# Patient Record
Sex: MALE | Race: WHITE | Employment: UNEMPLOYED | URBAN - METROPOLITAN AREA
[De-identification: names, ages, dates, MRNs, and addresses within clinical notes are randomized per-mention and may not be internally consistent; named-entity substitution may affect disease eponyms.]

---

## 2017-04-13 ENCOUNTER — HOSPITAL ENCOUNTER (EMERGENCY)
Age: 37
Discharge: HOME | End: 2017-04-13
Attending: STUDENT IN AN ORGANIZED HEALTH CARE EDUCATION/TRAINING PROGRAM
Payer: COMMERCIAL

## 2017-04-13 VITALS
BODY MASS INDEX: 35.43 KG/M2 | TEMPERATURE: 98.3 F | HEIGHT: 75 IN | WEIGHT: 285 LBS | OXYGEN SATURATION: 98 % | DIASTOLIC BLOOD PRESSURE: 62 MMHG | SYSTOLIC BLOOD PRESSURE: 114 MMHG | RESPIRATION RATE: 18 BRPM | HEART RATE: 63 BPM

## 2017-04-13 DIAGNOSIS — J40 BRONCHITIS: Primary | ICD-10-CM

## 2017-04-13 PROCEDURE — 99282 EMERGENCY DEPT VISIT SF MDM: CPT

## 2017-04-13 RX ORDER — LEVOFLOXACIN 500 MG/1
750 TABLET, FILM COATED ORAL DAILY
Qty: 15 TABLET | Refills: 0 | Status: SHIPPED | OUTPATIENT
Start: 2017-04-13 | End: 2017-04-23

## 2017-04-13 RX ORDER — PREDNISONE 10 MG/1
50 TABLET ORAL DAILY
Qty: 25 TABLET | Refills: 0 | Status: SHIPPED | OUTPATIENT
Start: 2017-04-13 | End: 2017-04-18

## 2017-04-13 ASSESSMENT — ENCOUNTER SYMPTOMS
BACK PAIN: 0
RHINORRHEA: 1
SHORTNESS OF BREATH: 0
COUGH: 1
ABDOMINAL PAIN: 0
SORE THROAT: 0
WHEEZING: 1

## 2019-04-15 ENCOUNTER — HOSPITAL ENCOUNTER (EMERGENCY)
Age: 39
Discharge: HOME | End: 2019-04-15
Payer: COMMERCIAL

## 2019-04-15 VITALS
RESPIRATION RATE: 20 BRPM | HEART RATE: 87 BPM | TEMPERATURE: 97.3 F | SYSTOLIC BLOOD PRESSURE: 106 MMHG | BODY MASS INDEX: 34.82 KG/M2 | WEIGHT: 280 LBS | DIASTOLIC BLOOD PRESSURE: 62 MMHG | OXYGEN SATURATION: 100 % | HEIGHT: 75 IN

## 2019-04-15 DIAGNOSIS — R11.11 NON-INTRACTABLE VOMITING WITHOUT NAUSEA, UNSPECIFIED VOMITING TYPE: Primary | ICD-10-CM

## 2019-04-15 PROCEDURE — 99283 EMERGENCY DEPT VISIT LOW MDM: CPT

## 2019-04-15 PROCEDURE — 6370000000 HC RX 637 (ALT 250 FOR IP): Performed by: NURSE PRACTITIONER

## 2019-04-15 RX ORDER — ONDANSETRON 4 MG/1
4 TABLET, ORALLY DISINTEGRATING ORAL EVERY 8 HOURS PRN
Qty: 12 TABLET | Refills: 0 | Status: SHIPPED | OUTPATIENT
Start: 2019-04-15 | End: 2021-12-17

## 2019-04-15 RX ORDER — ONDANSETRON 4 MG/1
4 TABLET, ORALLY DISINTEGRATING ORAL ONCE
Status: COMPLETED | OUTPATIENT
Start: 2019-04-15 | End: 2019-04-15

## 2019-04-15 RX ADMIN — ONDANSETRON 4 MG: 4 TABLET, ORALLY DISINTEGRATING ORAL at 20:46

## 2019-04-16 ASSESSMENT — ENCOUNTER SYMPTOMS
COUGH: 0
TROUBLE SWALLOWING: 0
VOICE CHANGE: 0
CONSTIPATION: 0
VOMITING: 1
NAUSEA: 1
ABDOMINAL PAIN: 0
SORE THROAT: 0
DIARRHEA: 0
BACK PAIN: 0
COLOR CHANGE: 0
SHORTNESS OF BREATH: 0

## 2019-04-16 NOTE — ED TRIAGE NOTES
Patient arrived from a party with complaint of drinking 6 beers and 3 nabil Cortez shots and then eating brownies and now has palpitations. Patient A&OX3. Skin pink, warm, and dry.

## 2019-04-16 NOTE — ED PROVIDER NOTES
Medical History:   Diagnosis Date    Pyloric stenosis        SURGICAL HISTORY        Past Surgical History:   Procedure Laterality Date    ABDOMEN SURGERY         CURRENT MEDICATIONS       Discharge Medication List as of 4/15/2019  9:14 PM          ALLERGIES     Pcn [penicillins]    FAMILY HISTORY     History reviewed. No pertinent family history.        SOCIAL HISTORY       Social History     Socioeconomic History    Marital status:      Spouse name: None    Number of children: None    Years of education: None    Highest education level: None   Occupational History    None   Social Needs    Financial resource strain: None    Food insecurity:     Worry: None     Inability: None    Transportation needs:     Medical: None     Non-medical: None   Tobacco Use    Smoking status: Current Every Day Smoker     Types: Cigarettes   Substance and Sexual Activity    Alcohol use: No    Drug use: No    Sexual activity: None   Lifestyle    Physical activity:     Days per week: None     Minutes per session: None    Stress: None   Relationships    Social connections:     Talks on phone: None     Gets together: None     Attends Baptism service: None     Active member of club or organization: None     Attends meetings of clubs or organizations: None     Relationship status: None    Intimate partner violence:     Fear of current or ex partner: None     Emotionally abused: None     Physically abused: None     Forced sexual activity: None   Other Topics Concern    None   Social History Narrative    None       SCREENINGS    Ally Coma Scale  Eye Opening: Spontaneous  Best Verbal Response: Oriented  Best Motor Response: Obeys commands  Ally Coma Scale Score: 15      PHYSICAL EXAM    (up to 7 for level 4, 8 or more for level 5)     ED Triage Vitals [04/15/19 2035]   BP Temp Temp Source Pulse Resp SpO2 Height Weight   106/62 97.3 °F (36.3 °C) Oral 87 20 100 % 6' 3\" (1.905 m) 280 lb (127 kg)       Physical speaking in a very loud voice. He was provided with zofran while in the ER. I have explained to the patient that there was most likely marijuana in the brownies and that if he had ingested heroin he would not be so alert. He will be discharged home in stable condition with a prescription for zofran. I have provided him with anticipatory guidance and have instructed on follow up and to return if not better or any new and concerning symptoms. The patient verbalized understanding. CRITICAL CARE TIME     Total Critical Care time(not applicable if blank)      Total minutes, excluding separately reportable procedures. There was a high probability of clinically significant/life threatening deterioration in the patient's condition which required my urgent intervention. This includesdiscussing the case with consultants, reviewing laboratory studies and images independently, arranging disposition, and speaking with patient/family    CONSULTS:  None    PROCEDURES:  Unless otherwise notedbelow, none     Procedures    FINAL IMPRESSION     1.  Non-intractable vomiting without nausea, unspecified vomiting type          DISPOSITION/PLAN   DISPOSITION Decision To Discharge 04/15/2019 09:13:19 PM      PATIENT REFERRED TO:  [facility]  [address]  Schedule an appointment as soon as possible for a visit in 1 day        DISCHARGE MEDICATIONS:  Discharge Medication List as of 4/15/2019  9:14 PM      START taking these medications    Details   ondansetron (ZOFRAN ODT) 4 MG disintegrating tablet Take 1 tablet by mouth every 8 hours as needed for Nausea or Vomiting, Disp-12 tablet, R-0Print                (Please note that portions of this note were completed with a voice recognition program.  Efforts were [facility] edit the dictations but occasionally words and phrases are mis-transcribed.)    TOM Ariza - CNP  (electronically signed)                 Delicia Root

## 2021-07-25 ENCOUNTER — HOSPITAL ENCOUNTER (EMERGENCY)
Age: 41
Discharge: HOME | End: 2021-07-25
Payer: COMMERCIAL

## 2021-07-25 ENCOUNTER — APPOINTMENT (OUTPATIENT)
Dept: GENERAL RADIOLOGY | Age: 41
End: 2021-07-25
Payer: COMMERCIAL

## 2021-07-25 VITALS
WEIGHT: 240 LBS | TEMPERATURE: 98.9 F | HEART RATE: 99 BPM | BODY MASS INDEX: 32.51 KG/M2 | SYSTOLIC BLOOD PRESSURE: 128 MMHG | DIASTOLIC BLOOD PRESSURE: 87 MMHG | RESPIRATION RATE: 18 BRPM | OXYGEN SATURATION: 95 % | HEIGHT: 72 IN

## 2021-07-25 DIAGNOSIS — J40 BRONCHITIS: Primary | ICD-10-CM

## 2021-07-25 LAB — SARS-COV-2, NAAT: NOT DETECTED

## 2021-07-25 PROCEDURE — 71046 X-RAY EXAM CHEST 2 VIEWS: CPT

## 2021-07-25 PROCEDURE — 99282 EMERGENCY DEPT VISIT SF MDM: CPT

## 2021-07-25 PROCEDURE — 87635 SARS-COV-2 COVID-19 AMP PRB: CPT

## 2021-07-25 RX ORDER — DOXYCYCLINE 100 MG/1
100 TABLET ORAL 2 TIMES DAILY
Qty: 20 TABLET | Refills: 0 | Status: SHIPPED | OUTPATIENT
Start: 2021-07-25 | End: 2021-08-04

## 2021-07-25 RX ORDER — GUAIFENESIN, PSEUDOEPHEDRINE HYDROCHLORIDE 600; 60 MG/1; MG/1
1 TABLET, EXTENDED RELEASE ORAL EVERY 12 HOURS
Qty: 14 TABLET | Refills: 0 | Status: SHIPPED | OUTPATIENT
Start: 2021-07-25 | End: 2021-08-01

## 2021-07-25 ASSESSMENT — ENCOUNTER SYMPTOMS
RHINORRHEA: 0
SORE THROAT: 0
TROUBLE SWALLOWING: 0
CONSTIPATION: 0
ABDOMINAL PAIN: 0
VOMITING: 0
COUGH: 1
NAUSEA: 0
DIARRHEA: 1
SHORTNESS OF BREATH: 0

## 2021-07-25 NOTE — ED PROVIDER NOTES
every 8 hours as needed for Nausea or Vomiting       ALLERGIES     Pcn [penicillins]    HISTORY     No family history on file. SOCIAL HISTORY       Social History     Socioeconomic History    Marital status:      Spouse name: Not on file    Number of children: Not on file    Years of education: Not on file    Highest education level: Not on file   Occupational History    Not on file   Tobacco Use    Smoking status: Current Every Day Smoker     Types: Cigarettes   Substance and Sexual Activity    Alcohol use: No    Drug use: No    Sexual activity: Not on file   Other Topics Concern    Not on file   Social History Narrative    Not on file     Social Determinants of Health     Financial Resource Strain:     Difficulty of Paying Living Expenses:    Food Insecurity:     Worried About Running Out of Food in the Last Year:     [address] in the Last Year:    Transportation Needs:     Lack of Transportation (Medical):  Lack of Transportation (Non-Medical):    Physical Activity:     Days of Exercise per Week:     Minutes of Exercise per Session:    Stress:     Feeling of Stress :    Social Connections:     Frequency of Communication with Friends and Family:     Frequency of Social Gatherings with Friends and Family:     Attends Orthodoxy Services:     Active Member of Clubs or Organizations:     Attends Club or Organization Meetings:     Marital Status:    Intimate Partner Violence:     Fear of Current or Ex-Partner:     Emotionally Abused:     Physically Abused:     Sexually Abused:        SCREENINGS           PHYSICAL EXAM    (up to 7 forlevel 4, 8 or more for level 5)     ED Triage Vitals [07/25/21 1023]   BP Temp Temp src Pulse Resp SpO2 Height Weight   128/87 98.9 °F (37.2 °C) -- 99 18 95 % 6' (1.829 m) 240 lb (108.9 kg)       Physical Exam  Vitals and nursing note reviewed. Constitutional:       General: He is not in acute distress.      Appearance: He is infiltrate     Patient notified that his X-rays will additionally be reviewed by a radiologist and he will be notified of any discrepancies. Interpretation per the Radiologist below, if available at the time of this note:    XR CHEST (2 VW)    (Results Pending)       LABS:  Labs Reviewed   COVID-19, RAPID       All other labs were within normal range or not returnedas of this dictation. EMERGENCYDEPARTMENT COURSE and DIFFERENTIAL DIAGNOSIS/MDM:   Vitals:    Vitals:    07/25/21 1023   BP: 128/87   Pulse: 99   Resp: 18   Temp: 98.9 °F (37.2 °C)   SpO2: 95%   Weight: 240 lb (108.9 kg)   Height: 6' (1.829 m)       Rapid covid19:  negative    MDM    Patient counseled that he is to go to the Emergency Department if his condition worsens or changes. Patient voiced understanding      PROCEDURES:    Procedures      FINAL IMPRESSION      1. Bronchitis          DISPOSITION/PLAN   DISPOSITION Decision To Discharge 07/25/2021 11:20:30 AM      PATIENT REFERRED TO:  No follow-up provider specified. DISCHARGE MEDICATIONS:  New Prescriptions    DOXYCYCLINE MONOHYDRATE (ADOXA) 100 MG TABLET    Take 1 tablet by mouth 2 times daily for 10 days May substitute another form of Doxycycline if insurance requires.     PSEUDOEPHEDRINE-GUAIFENESIN (MUCINEX D)  MG PER EXTENDED RELEASE TABLET    Take 1 tablet by mouth every 12 hours for 7 days       (Please note thatportions of this note were completed with a voice recognition program.  Efforts were made to edit the dictations but occasionally words are mis-transcribed.)    BRISEIDA Recinos PA-C  07/25/21 3253 [facility]  07/25/21 6987

## 2021-08-02 ENCOUNTER — HOSPITAL ENCOUNTER (EMERGENCY)
Age: 41
Discharge: LEFT BEFORE DISCHARGE | End: 2021-08-02
Payer: COMMERCIAL

## 2021-08-02 NOTE — ED NOTES
Per aracelis, pt came to desk and said he made a doctors appt for tomorrow and left.      Delmi Richmond RN  08/02/21 9601

## 2021-12-17 ENCOUNTER — VIRTUAL VISIT (OUTPATIENT)
Dept: FAMILY MEDICINE CLINIC | Age: 41
End: 2021-12-17
Payer: COMMERCIAL

## 2021-12-17 ENCOUNTER — NURSE ONLY (OUTPATIENT)
Dept: FAMILY MEDICINE CLINIC | Age: 41
End: 2021-12-17

## 2021-12-17 ENCOUNTER — TELEPHONE (OUTPATIENT)
Dept: FAMILY MEDICINE CLINIC | Age: 41
End: 2021-12-17

## 2021-12-17 DIAGNOSIS — Z20.822 SUSPECTED COVID-19 VIRUS INFECTION: Primary | ICD-10-CM

## 2021-12-17 DIAGNOSIS — J06.9 VIRAL URI: ICD-10-CM

## 2021-12-17 LAB
INFLUENZA A ANTIBODY: NORMAL
INFLUENZA B ANTIBODY: NORMAL
Lab: ABNORMAL
PERFORMING INSTRUMENT: ABNORMAL
QC PASS/FAIL: ABNORMAL
SARS-COV-2, POC: DETECTED

## 2021-12-17 PROCEDURE — 87426 SARSCOV CORONAVIRUS AG IA: CPT | Performed by: STUDENT IN AN ORGANIZED HEALTH CARE EDUCATION/TRAINING PROGRAM

## 2021-12-17 PROCEDURE — 87804 INFLUENZA ASSAY W/OPTIC: CPT | Performed by: STUDENT IN AN ORGANIZED HEALTH CARE EDUCATION/TRAINING PROGRAM

## 2021-12-17 PROCEDURE — 99422 OL DIG E/M SVC 11-20 MIN: CPT | Performed by: STUDENT IN AN ORGANIZED HEALTH CARE EDUCATION/TRAINING PROGRAM

## 2021-12-17 SDOH — ECONOMIC STABILITY: FOOD INSECURITY: WITHIN THE PAST 12 MONTHS, THE FOOD YOU BOUGHT JUST DIDN'T LAST AND YOU DIDN'T HAVE MONEY TO GET MORE.: NEVER TRUE

## 2021-12-17 SDOH — ECONOMIC STABILITY: FOOD INSECURITY: WITHIN THE PAST 12 MONTHS, YOU WORRIED THAT YOUR FOOD WOULD RUN OUT BEFORE YOU GOT MONEY TO BUY MORE.: NEVER TRUE

## 2021-12-17 ASSESSMENT — ENCOUNTER SYMPTOMS
SORE THROAT: 0
ABDOMINAL PAIN: 0
SINUS PRESSURE: 0
SHORTNESS OF BREATH: 0
VOMITING: 0
COUGH: 0

## 2021-12-17 ASSESSMENT — PATIENT HEALTH QUESTIONNAIRE - PHQ9
SUM OF ALL RESPONSES TO PHQ QUESTIONS 1-9: 0
SUM OF ALL RESPONSES TO PHQ QUESTIONS 1-9: 0
2. FEELING DOWN, DEPRESSED OR HOPELESS: 0
SUM OF ALL RESPONSES TO PHQ QUESTIONS 1-9: 0
1. LITTLE INTEREST OR PLEASURE IN DOING THINGS: 0
SUM OF ALL RESPONSES TO PHQ9 QUESTIONS 1 & 2: 0

## 2021-12-17 ASSESSMENT — SOCIAL DETERMINANTS OF HEALTH (SDOH): HOW HARD IS IT FOR YOU TO PAY FOR THE VERY BASICS LIKE FOOD, HOUSING, MEDICAL CARE, AND HEATING?: NOT HARD AT ALL

## 2021-12-17 NOTE — TELEPHONE ENCOUNTER
Pt aware of positive covid results. Advised to quarantine for 10 days start of symptoms. Will call back if he has any questions or concerns.

## 2021-12-17 NOTE — PROGRESS NOTES
This visit began at 12:40pm    Location of the visit was the [facility] primary care site. TELEHEALTH APPOINTMENT  Patient has been screened to determine that this visit qualifies for a \"Telephone Visit\". Patient is currently established with the current medical practice, the condition being reviewed was not addressed within the previous 7 days and is not likely be determined to need a procedure within the next 24 hours. This visit was via telephone due to the restrictions of the COVID-19 pandemic. All issues as below were discussed and addressed but no physical exam was performed. It was felt the patient should be evaluated in the clinic there will be comment below demonstrating they were directed there. The patient is aware and has given verbal consent to be billed for this telephone encounter. Patient Name: Murphy Hatchet  MRN: [id number]    Chief Complaint   Patient presents with    Headache     x1 day     Arm Pain    Fever       HPI  URI  Symptoms started one day ago  Endorsing headache, fever and muscle aches  Denied cough, SOB, nasal congestion, rhinorrhea  Son with similar symptoms  Did not receive any COVID vaccines    No other significant PMH      PMH:  No current outpatient medications on file prior to visit. No current facility-administered medications on file prior to visit.      Past Medical History:   Diagnosis Date    Pyloric stenosis      Past Surgical History:   Procedure Laterality Date    ABDOMEN SURGERY       Social History     Socioeconomic History    Marital status:      Spouse name: Not on file    Number of children: Not on file    Years of education: Not on file    Highest education level: Not on file   Occupational History    Not on file   Tobacco Use    Smoking status: Current Every Day Smoker     Packs/day: 0.25     Years: 20.00     Pack years: 5.00     Types: Cigarettes    Smokeless tobacco: Never Used   Substance and Sexual Activity    Alcohol use: No    Drug use: No    Sexual activity: Not on file   Other Topics Concern    Not on file   Social History Narrative    Not on file     Social Determinants of Health     Financial Resource Strain: Low Risk     Difficulty of Paying Living Expenses: Not hard at all   Food Insecurity: No Food Insecurity    Worried About Running Out of Food in the Last Year: Never true    [address] in the Last Year: Never true   Transportation Needs:     Lack of Transportation (Medical): Not on file    Lack of Transportation (Non-Medical): Not on file   Physical Activity:     Days of Exercise per Week: Not on file    Minutes of Exercise per Session: Not on file   Stress:     Feeling of Stress : Not on file   Social Connections:     Frequency of Communication with Friends and Family: Not on file    Frequency of Social Gatherings with Friends and Family: Not on file    Attends Quaker Services: Not on file    Active Member of [address] [place] [address] or Organizations: Not on file    Attends Club or Organization Meetings: Not on file    Marital Status: Not on file   Intimate Partner Violence:     Fear of Current or Ex-Partner: Not on file    Emotionally Abused: Not on file    Physically Abused: Not on file    Sexually Abused: Not on file   Housing Stability:     Unable to Pay for Housing in the Last Year: Not on file    Number of [place] in the Last Year: Not on file    Unstable Housing in the Last Year: Not on file     No family history on file. Allergies:  Amoxicillin and Pcn [penicillins]    Review of Systems   Constitutional: Positive for fever. Negative for chills. HENT: Negative for congestion, sinus pressure and sore throat. Respiratory: Negative for cough and shortness of breath. Cardiovascular: Negative for chest pain and palpitations. Gastrointestinal: Negative for abdominal pain and vomiting. Musculoskeletal: Positive for myalgias. Negative for arthralgias.    Skin: Negative for rash and wound. Neurological: Positive for headaches. Negative for speech difficulty and light-headedness. Psychiatric/Behavioral: Negative for suicidal ideas. The patient is not nervous/anxious. PHYSICAL EXAM/ RESULTS    There were no vitals taken for this visit. Vitals signs: pt does not have the proper equipment to take all VS.    The physical is not performed due to the visit being a telephone counter as indicated due to the restrictions of the COVID-19 pandemic    No results found for this or any previous visit (from the past 2016 hour(s)). Assessment:       Diagnosis Orders   1. Suspected COVID-19 virus infection     Will call patient with results, son with similar symptoms  Counseled on Covid vaccine      No orders of the defined types were placed in this encounter. Plan:   Return today (on 12/17/2021) for today for drive up COVID/flu testing. There are no Patient Instructions on file for this visit.     This visit ended at 12:55pm    Demetrius Ambriz DO

## 2021-12-20 ENCOUNTER — APPOINTMENT (OUTPATIENT)
Dept: GENERAL RADIOLOGY | Age: 41
End: 2021-12-20
Payer: COMMERCIAL

## 2021-12-20 ENCOUNTER — HOSPITAL ENCOUNTER (EMERGENCY)
Age: 41
Discharge: HOME | End: 2021-12-20
Payer: COMMERCIAL

## 2021-12-20 DIAGNOSIS — U07.1 LAB TEST POSITIVE FOR DETECTION OF COVID-19 VIRUS: Primary | ICD-10-CM

## 2021-12-20 LAB
INFLUENZA A BY PCR: NEGATIVE
INFLUENZA B BY PCR: NEGATIVE
SARS-COV-2, NAAT: DETECTED

## 2021-12-20 PROCEDURE — 99283 EMERGENCY DEPT VISIT LOW MDM: CPT

## 2021-12-20 PROCEDURE — 87502 INFLUENZA DNA AMP PROBE: CPT

## 2021-12-20 PROCEDURE — 87635 SARS-COV-2 COVID-19 AMP PRB: CPT

## 2021-12-20 PROCEDURE — 71046 X-RAY EXAM CHEST 2 VIEWS: CPT

## 2021-12-20 ASSESSMENT — ENCOUNTER SYMPTOMS
SORE THROAT: 0
VOMITING: 0
SHORTNESS OF BREATH: 0
NAUSEA: 0
BACK PAIN: 0
COUGH: 0
DIARRHEA: 0
ABDOMINAL PAIN: 0

## 2021-12-20 ASSESSMENT — PAIN SCALES - GENERAL: PAINLEVEL_OUTOF10: 6

## 2021-12-20 NOTE — ED TRIAGE NOTES
Pt  Comes to er with his son asking for covid, flu and  for both of them. The son tested positive yesterday and the  Father c/o  A h/a for 5 days and his wife told him that he probably had covid. Pt would like a cxr also to make sure he doesn't have pneumonia.  Pt cynthia adamant about what he wants

## 2021-12-20 NOTE — ED PROVIDER NOTES
[address] [facility] ED  eMERGENCY dEPARTMENT eNCOUnter      Pt Name: Nadine Gan  MRN: [id number]  [place] 1980  Date of evaluation: 12/20/2021  Provider: TOM Hernandes CNP      HISTORY OF PRESENT ILLNESS    Nadine Gan is a 39 y.o. male who presents to the Emergency Department with HA x 7 days. Patient denies fever, cough, loss of taste or smell. Patient is eating and drinking well. Pain is minimal.           REVIEW OF SYSTEMS       Review of Systems   Constitutional: Negative for activity change, appetite change and fever. HENT: Negative for congestion, drooling, ear pain and sore throat. Respiratory: Negative for cough and shortness of breath. Cardiovascular: Negative for chest pain. Gastrointestinal: Negative for abdominal pain, diarrhea, nausea and vomiting. Genitourinary: Negative for dysuria. Musculoskeletal: Negative for arthralgias, back pain and neck pain. Skin: Negative for rash. Neurological: Positive for headaches. Negative for dizziness, syncope and light-headedness. All other systems reviewed and are negative. PAST MEDICAL HISTORY     Past Medical History:   Diagnosis Date    Pyloric stenosis          SURGICAL HISTORY       Past Surgical History:   Procedure Laterality Date    ABDOMEN SURGERY           CURRENT MEDICATIONS       Previous Medications    No medications on file       ALLERGIES     Amoxicillin and Pcn [penicillins]    FAMILY HISTORY     History reviewed. No pertinent family history.        SOCIAL HISTORY       Social History     Socioeconomic History    Marital status:      Spouse name: None    Number of children: None    Years of education: None    Highest education level: None   Occupational History    None   Tobacco Use    Smoking status: Current Every Day Smoker     Packs/day: 0.25     Years: 20.00     Pack years: 5.00     Types: Cigarettes    Smokeless tobacco: Never Used   Substance and Sexual Activity    Alcohol use: No    Drug use: No    Sexual activity: None   Other Topics Concern    None   Social History Narrative    None     Social Determinants of Health     Financial Resource Strain: Low Risk     Difficulty of Paying Living Expenses: Not hard at all   Food Insecurity: No Food Insecurity    Worried About Running Out of Food in the Last Year: Never true    [place] of Food in the Last Year: Never true   Transportation Needs:     Lack of Transportation (Medical): Not on file    Lack of Transportation (Non-Medical): Not on file   Physical Activity:     Days of Exercise per Week: Not on file    Minutes of Exercise per Session: Not on file   Stress:     Feeling of Stress : Not on file   Social Connections:     Frequency of Communication with Friends and Family: Not on file    Frequency of Social Gatherings with Friends and Family: Not on file    Attends Yazidi Services: Not on file    Active Member of [address] [place] [address] [employer] or Organizations: Not on file    Attends Club or Organization Meetings: Not on file    Marital Status: Not on file   Intimate Partner Violence:     Fear of Current or Ex-Partner: Not on file    Emotionally Abused: Not on file    Physically Abused: Not on file    Sexually Abused: Not on file   Housing Stability:     Unable to Pay for Housing in the Last Year: Not on file    Number of [place] in the Last Year: Not on file    Unstable Housing in the Last Year: Not on file       SCREENINGS      @FLOW([id number])@      PHYSICAL EXAM    (up to 7 for level 4, 8 or more for level 5)     ED Triage Vitals   BP Temp Temp src Pulse Resp SpO2 Height Weight   -- -- -- -- -- -- -- --       Physical Exam  Vitals and nursing note reviewed. Constitutional:       Appearance: He is well-developed. HENT:      Head: Normocephalic and atraumatic.       Right Ear: Hearing, tympanic membrane, ear canal and external ear normal.      Left Ear: Hearing, tympanic membrane, ear canal and external ear normal. Nose: Nose normal.      Mouth/Throat:      Lips: Pink. Mouth: Mucous membranes are moist.      Pharynx: Oropharynx is clear. Uvula midline. Eyes:      Conjunctiva/sclera: Conjunctivae normal.      Pupils: Pupils are equal, round, and reactive to light. Cardiovascular:      Rate and Rhythm: Normal rate and regular rhythm. Heart sounds: Normal heart sounds. Pulmonary:      Effort: Pulmonary effort is normal. No accessory muscle usage or respiratory distress. Breath sounds: Normal breath sounds. No decreased air movement. No decreased breath sounds, wheezing or rhonchi. Abdominal:      General: Bowel sounds are normal. There is no distension. Palpations: Abdomen is soft. Tenderness: There is no abdominal tenderness. Musculoskeletal:         General: Normal range of motion. Cervical back: Normal range of motion and neck supple. Skin:     General: Skin is warm and dry. Neurological:      General: No focal deficit present. Mental Status: He is alert and oriented to person, place, and time. GCS: GCS eye subscore is 4. GCS verbal subscore is 5. GCS motor subscore is 6. Deep Tendon Reflexes: Reflexes are normal and symmetric. Psychiatric:         Judgment: Judgment normal.           All other labs were within normal range or not returned as of this dictation. EMERGENCY DEPARTMENT COURSE and DIFFERENTIALDIAGNOSIS/MDM:   Vitals: There were no vitals filed for this visit. 39 yr old male with positive COVID. Patient is comfortable at discharge and verbalizes understanding. PROCEDURES:  Unless otherwise noted below, none     Procedures      FINAL IMPRESSION      1.  Lab test positive for detection of COVID-19 virus          DISPOSITION/PLAN   DISPOSITION Decision To Discharge 12/20/2021 04:32:24 PM          TOM Brown CNP (electronically signed)  Attending Emergency Physician     TOM Brown CNP  12/20/21 7113

## 2021-12-21 ENCOUNTER — CARE COORDINATION (OUTPATIENT)
Dept: CARE COORDINATION | Age: 41
End: 2021-12-21

## 2021-12-21 NOTE — CARE COORDINATION
Patient contacted regarding COVID-19 diagnosis. Discussed COVID-19 related testing which was available at this time. Test results were positive. Patient informed of results, if available? Yes. Ambulatory Care Manager contacted the patient by telephone to perform post discharge assessment. Call within 2 business days of discharge: Yes. Verified name and  with patient as identifiers. Provided introduction to self, and explanation of the CTN/ACM role, and reason for call due to risk factors for infection and/or exposure to COVID-19. Symptoms reviewed with patient who verbalized the following symptoms: no new symptoms, no worsening symptoms and HEADACHE. Due to no new or worsening symptoms encounter was not routed to provider for escalation. Discussed follow-up appointments. If no appointment was previously scheduled, appointment scheduling offered: Yes. [facility] follow up appointment(s): No future appointments. Non-[facility] follow up appointment(s): NONE    Non-face-to-face services provided:  Obtained and reviewed discharge summary and/or continuity of care documents     Advance Care Planning:   Does patient have an Advance Directive:  not on file. Educated patient about risk for severe COVID-19 due to risk factors according to CDC guidelines. ACM reviewed discharge instructions, medical action plan and red flag symptoms with the patient who verbalized understanding. Discussed COVID vaccination status: Yes and UNVACCINATED. Education provided on COVID-19 vaccination as appropriate. Discussed exposure protocols and quarantine with CDC Guidelines. Patient was given an opportunity to verbalize any questions and concerns and agrees to contact ACM or health care provider for questions related to their healthcare. Reviewed and educated patient on any new and changed medications related to discharge diagnosis     Was patient discharged with a pulse oximeter? No       ACM provided contact information.  No further follow-up call identified based on severity of symptoms and risk factors.

## 2022-12-30 ENCOUNTER — HOSPITAL ENCOUNTER (EMERGENCY)
Age: 42
Discharge: HOME | End: 2022-12-30
Payer: COMMERCIAL

## 2022-12-30 VITALS
TEMPERATURE: 98.3 F | DIASTOLIC BLOOD PRESSURE: 74 MMHG | HEIGHT: 75 IN | SYSTOLIC BLOOD PRESSURE: 134 MMHG | OXYGEN SATURATION: 95 % | BODY MASS INDEX: 24.87 KG/M2 | WEIGHT: 200 LBS | RESPIRATION RATE: 19 BRPM | HEART RATE: 78 BPM

## 2022-12-30 DIAGNOSIS — J01.10 ACUTE FRONTAL SINUSITIS, RECURRENCE NOT SPECIFIED: ICD-10-CM

## 2022-12-30 DIAGNOSIS — U07.1 COVID-19: Primary | ICD-10-CM

## 2022-12-30 LAB
INFLUENZA A BY PCR: NEGATIVE
INFLUENZA B BY PCR: NEGATIVE
SARS-COV-2, NAAT: DETECTED
STREP GRP A PCR: NEGATIVE

## 2022-12-30 PROCEDURE — 99283 EMERGENCY DEPT VISIT LOW MDM: CPT

## 2022-12-30 PROCEDURE — 87635 SARS-COV-2 COVID-19 AMP PRB: CPT

## 2022-12-30 PROCEDURE — 87651 STREP A DNA AMP PROBE: CPT

## 2022-12-30 PROCEDURE — 87502 INFLUENZA DNA AMP PROBE: CPT

## 2022-12-30 RX ORDER — DOXYCYCLINE HYCLATE 100 MG
100 TABLET ORAL 2 TIMES DAILY
Qty: 14 TABLET | Refills: 0 | Status: SHIPPED | OUTPATIENT
Start: 2022-12-30 | End: 2023-01-06

## 2022-12-30 ASSESSMENT — ENCOUNTER SYMPTOMS
ABDOMINAL PAIN: 0
SINUS PAIN: 0
TROUBLE SWALLOWING: 0
VOMITING: 0
COUGH: 0
RHINORRHEA: 0
SINUS PRESSURE: 0
SORE THROAT: 0
VOICE CHANGE: 0
PHOTOPHOBIA: 0
WHEEZING: 0
SHORTNESS OF BREATH: 0
NAUSEA: 0

## 2022-12-30 ASSESSMENT — PAIN - FUNCTIONAL ASSESSMENT
PAIN_FUNCTIONAL_ASSESSMENT: NONE - DENIES PAIN
PAIN_FUNCTIONAL_ASSESSMENT: NONE - DENIES PAIN

## 2022-12-30 NOTE — ED TRIAGE NOTES
Pt alert and calm. Pt resp even no s/s of resp distress. Pt denies pain. Pt states that he is congested.

## 2022-12-30 NOTE — ED PROVIDER NOTES
[address] [facility] ED  EMERGENCY DEPARTMENT ENCOUNTER      Pt Name: Darylene Augusta  MRN: [id number]  [place] 1980  Date of evaluation: 12/30/2022  Provider: Tank Starr Dr       Chief Complaint   Patient presents with    Sinusitis         HISTORY OF PRESENT ILLNESS   (Location/Symptom, Timing/Onset, Context/Setting, Quality, Duration, Modifying Factors, Severity)  Note limiting factors. Darylene Augusta is a 43 y.o. male who per chart reviews a past medical history of pyloric stenosis, tobacco use presents to the emergency department for evaluation of nasal congestion x1 week. He states mucus is thick and white in color. Has tried Mucinex without relief. He states he cannot use nasal sprays as they cause nosebleeds. Denies known sick contacts. Was not vaccinated for COVID or flu. Denies any other associated symptoms. Denies fever chills chest pain shortness of breath cough ear or throat pain dizziness abdominal pain headache nausea vomiting diarrhea. HPI    Nursing Notes were reviewed. REVIEW OF SYSTEMS    (2-9 systems for level 4, 10 or more for level 5)     Review of Systems   Constitutional:  Negative for chills and fever. HENT:  Positive for congestion. Negative for ear pain, rhinorrhea, sinus pressure, sinus pain, sore throat, trouble swallowing and voice change. Eyes:  Negative for photophobia. Respiratory:  Negative for cough, shortness of breath and wheezing. Cardiovascular:  Negative for chest pain and palpitations. Gastrointestinal:  Negative for abdominal pain, nausea and vomiting. Genitourinary:  Negative for dysuria, frequency and hematuria. Musculoskeletal:  Negative for myalgias. Allergic/Immunologic: Negative for immunocompromised state. Neurological:  Negative for dizziness, weakness and headaches. All other systems reviewed and are negative.     Except as noted above the remainder of the review of systems was reviewed and negative. PAST MEDICAL HISTORY     Past Medical History:   Diagnosis Date    Pyloric stenosis          SURGICAL HISTORY       Past Surgical History:   Procedure Laterality Date    ABDOMEN SURGERY           CURRENT MEDICATIONS       Discharge Medication List as of 12/30/2022  9:31 AM          ALLERGIES     Amoxicillin and Pcn [penicillins]    FAMILY HISTORY     No family history on file. SOCIAL HISTORY       Social History     Socioeconomic History    Marital status:    Tobacco Use    Smoking status: Every Day     Packs/day: 0.25     Years: 20.00     Pack years: 5.00     Types: Cigarettes    Smokeless tobacco: Never   Vaping Use    Vaping Use: Never used   Substance and Sexual Activity    Alcohol use: No    Drug use: No    Sexual activity: Not Currently       SCREENINGS         [place] Coma Scale  Eye Opening: Spontaneous  Best Verbal Response: Oriented  Best Motor Response: Obeys commands  Ally Coma Scale Score: 15                     CIWA Assessment  BP: 134/74  Heart Rate: 78                 PHYSICAL EXAM    (up to 7 for level 4, 8 or more for level 5)     ED Triage Vitals [12/30/22 0821]   BP Temp Temp src Heart Rate Resp SpO2 Height Weight   134/74 98.3 °F (36.8 °C) -- 78 19 95 % 6' 3\" (1.905 m) 200 lb (90.7 kg)       Physical Exam  Constitutional:       General: He is not in acute distress. Appearance: He is well-developed. He is not ill-appearing or toxic-appearing. HENT:      Head: Normocephalic and atraumatic. No right periorbital erythema or left periorbital erythema. Jaw: There is normal jaw occlusion. Right Ear: Tympanic membrane, ear canal and external ear normal. There is no impacted cerumen. Left Ear: Tympanic membrane and external ear normal.      Nose: Congestion present. No nasal deformity, nasal tenderness, mucosal edema or rhinorrhea. Right Nostril: No foreign body or epistaxis. Left Nostril: No foreign body or epistaxis.       Right Turbinates: Enlarged and swollen. Not pale. Left Turbinates: Enlarged and swollen. Not pale. Right Sinus: Frontal sinus tenderness present. No maxillary sinus tenderness. Left Sinus: Frontal sinus tenderness present. No maxillary sinus tenderness. Mouth/Throat:      Mouth: Mucous membranes are moist.   Eyes:      Pupils: Pupils are equal, round, and reactive to light. Cardiovascular:      Rate and Rhythm: Normal rate and regular rhythm. Heart sounds: No murmur heard. No friction rub. No gallop. Pulmonary:      Effort: Pulmonary effort is normal.      Breath sounds: Normal breath sounds. No wheezing, rhonchi or rales. Abdominal:      General: Bowel sounds are normal. There is no distension. Palpations: Abdomen is soft. Tenderness: There is no abdominal tenderness. Musculoskeletal:         General: No swelling. Cervical back: Normal range of motion. Skin:     General: Skin is warm and dry. Capillary Refill: Capillary refill takes less than 2 seconds. Neurological:      General: No focal deficit present. Mental Status: He is alert and oriented to person, place, and time.        DIAGNOSTIC RESULTS     EKG: All EKG's are interpreted by the Emergency Department Physician who either signs or Co-signs this chart in the absence of a cardiologist.        RADIOLOGY:   Non-plain film images such as CT, Ultrasound and MRI are read by the radiologist. Plain radiographic images are visualized and preliminarily interpreted by the emergency physician with the below findings:        Interpretation per the Radiologist below, if available at the time of this note:    No orders to display         ED BEDSIDE ULTRASOUND:   Performed by ED Physician - none    LABS:  Labs Reviewed   COVID-19, RAPID - Abnormal; Notable for the following components:       Result Value    SARS-CoV-2, NAAT DETECTED (*)     All other components within normal limits   RAPID STREP SCREEN   RAPID INFLUENZA A/B ANTIGENS       All other labs were within normal range or not returned as of this dictation. EMERGENCY DEPARTMENT COURSE and DIFFERENTIAL DIAGNOSIS/MDM:   Vitals:    Vitals:    12/30/22 0821   BP: 134/74   Pulse: 78   Resp: 19   Temp: 98.3 °F (36.8 °C)   SpO2: 95%   Weight: 200 lb (90.7 kg)   Height: 6' 3\" (1.905 m)           MDM    Patient is a 80-year-old male presents the ED for evaluation of sinus congestion x1 week. He is afebrile and hemodynamically stable. He is COVID-positive. Flu and strep negative. Due to history of nasal congestion x1 week and frontal tenderness to palpation we will treat for acute sinusitis with doxycycline. Patient has allergy to amoxicillin and penicillins. He should continue mucinex. Patient denies any chest pain or shortness of breath. Lungs are clear bilaterally no signs of respiratory distress 95% on room air. He is stable for discharge at this time. Self quarantine 5 days from symptom onset. Follow-up with primary care 3 to 5 days return to the ED immediately for worsening symptoms given warning signs for which she should return. Pt understands, agrees to plan. REASSESSMENT          CRITICAL CARE TIME   Total Critical Care time was 0 minutes, excluding separately reportable procedures. There was a high probability of clinically significant/life threatening deterioration in the patient's condition which required my urgent intervention. CONSULTS:  None    PROCEDURES:  Unless otherwise noted below, none     Procedures        FINAL IMPRESSION      1. COVID-19    2.  Acute frontal sinusitis, recurrence not specified          DISPOSITION/PLAN   DISPOSITION Decision To Discharge 12/30/2022 09:41:42 AM      PATIENT REFERRED TO:  Doretha Mancilla, [address] [facility] [address]    Schedule an appointment as soon as possible for a visit in 1 week      [facility]) ED  [address]  [phone]  Go to   As needed, If symptoms worsen      DISCHARGE MEDICATIONS:  Discharge Medication List as of 12/30/2022  9:31 AM        START taking these medications    Details   doxycycline hyclate (VIBRA-TABS) 100 MG tablet Take 1 tablet by mouth 2 times daily for 7 days, Disp-14 tablet, R-0Normal           Controlled Substances Monitoring:     No flowsheet data found.     (Please note that portions of this note were completed with a voice recognition program.  Efforts were made to edit the dictations but occasionally words are mis-transcribed.)    Denezl Chase PA-C (electronically signed)             Denzel Chase PA-C  12/30/22 1054

## 2023-05-26 ENCOUNTER — OFFICE VISIT (OUTPATIENT)
Dept: ORTHOPEDIC SURGERY | Age: 43
End: 2023-05-26
Payer: COMMERCIAL

## 2023-05-26 DIAGNOSIS — S46.211A TEAR OF RIGHT BICEPS MUSCLE, INITIAL ENCOUNTER: Primary | ICD-10-CM

## 2023-05-26 PROCEDURE — 4004F PT TOBACCO SCREEN RCVD TLK: CPT | Performed by: STUDENT IN AN ORGANIZED HEALTH CARE EDUCATION/TRAINING PROGRAM

## 2023-05-26 PROCEDURE — 99204 OFFICE O/P NEW MOD 45 MIN: CPT | Performed by: STUDENT IN AN ORGANIZED HEALTH CARE EDUCATION/TRAINING PROGRAM

## 2023-05-26 PROCEDURE — G8427 DOCREV CUR MEDS BY ELIG CLIN: HCPCS | Performed by: STUDENT IN AN ORGANIZED HEALTH CARE EDUCATION/TRAINING PROGRAM

## 2023-05-26 PROCEDURE — G8419 CALC BMI OUT NRM PARAM NOF/U: HCPCS | Performed by: STUDENT IN AN ORGANIZED HEALTH CARE EDUCATION/TRAINING PROGRAM

## 2023-05-26 ASSESSMENT — ENCOUNTER SYMPTOMS
RESPIRATORY NEGATIVE: 1
GASTROINTESTINAL NEGATIVE: 1
ALLERGIC/IMMUNOLOGIC NEGATIVE: 1
EYES NEGATIVE: 1

## 2023-05-26 NOTE — PROGRESS NOTES
Orthopedic Surgery and Sports Medicine    Subjective:      Patient ID: Janis Hudson is a 43 y.o. male who presents today for:  Chief Complaint   Patient presents with    New Patient     Here for exam for Rt upper arm pain, states he may have injured it from lifting weights, has been ongoing for 8 yrs        HPI  Trevin Cardoza is a 49-year-old male who presents today for evaluation of his right arm. He states that he had an injury to his right biceps muscle as a child. He states that he was seen in the ER when he was a kid and had stitches placed. It does not sound like they repaired his biceps muscle, but instead he had a wound that they closed over his arm. However since that time he has had deformity in the biceps muscle. Over the years recently it has started to cause him more pain and problems. He has deformity of the biceps. He notes pain with any lifting. He denies any pain in the actual shoulder or elbow joint themselves. He states that he would like to be more active and get back into working out, but cannot because of his right biceps. This is his dominant arm. He denies any numbness in the affected extremity. Previous treatment: None  NSAIDs: No  Physical therapy: No    Hand Dominance: right  Occupation: [email or web address]  Workers Compensation:   Have you missed work for this issue? No  Is this issue being addressed under a worker's compensation claim?  No    Past Medical History:   Diagnosis Date    Pyloric stenosis       Past Surgical History:   Procedure Laterality Date    ABDOMEN SURGERY       Social History     Socioeconomic History    Marital status:      Spouse name: Not on file    Number of children: Not on file    Years of education: Not on file    Highest education level: Not on file   Occupational History    Not on file   Tobacco Use    Smoking status: Every Day     Packs/day: 0.25     Years: 20.00     Pack years: 5.00     Types: Cigarettes    Smokeless tobacco: Never

## 2023-11-02 ENCOUNTER — HOSPITAL ENCOUNTER (EMERGENCY)
Age: 43
Discharge: HOME | End: 2023-11-02
Payer: MEDICAID

## 2023-11-02 VITALS
SYSTOLIC BLOOD PRESSURE: 121 MMHG | RESPIRATION RATE: 18 BRPM | HEART RATE: 74 BPM | DIASTOLIC BLOOD PRESSURE: 76 MMHG | TEMPERATURE: 98.4 F | OXYGEN SATURATION: 95 % | BODY MASS INDEX: 33.56 KG/M2 | WEIGHT: 275.57 LBS | HEIGHT: 76 IN

## 2023-11-02 DIAGNOSIS — J02.9 ACUTE PHARYNGITIS, UNSPECIFIED ETIOLOGY: ICD-10-CM

## 2023-11-02 DIAGNOSIS — Z20.2 EXPOSURE TO STD: Primary | ICD-10-CM

## 2023-11-02 LAB
BILIRUB UR QL STRIP: NEGATIVE
CLARITY UR: CLEAR
COLOR UR: YELLOW
GLUCOSE UR STRIP-MCNC: NEGATIVE MG/DL
HGB UR QL STRIP: NEGATIVE
KETONES UR STRIP-MCNC: NEGATIVE MG/DL
LEUKOCYTE ESTERASE UR QL STRIP: NEGATIVE
NITRITE UR QL STRIP: NEGATIVE
PH UR STRIP: 6.5 [PH] (ref 5–9)
PROT UR STRIP-MCNC: NEGATIVE MG/DL
SP GR UR STRIP: 1 (ref 1–1.03)
SPECIMEN SOURCE: NORMAL
STREP GRP A PCR: NEGATIVE
URINE REFLEX TO CULTURE: NORMAL
UROBILINOGEN UR STRIP-ACNC: 0.2 E.U./DL

## 2023-11-02 PROCEDURE — 87661 TRICHOMONAS VAGINALIS AMPLIF: CPT

## 2023-11-02 PROCEDURE — 81003 URINALYSIS AUTO W/O SCOPE: CPT

## 2023-11-02 PROCEDURE — 99284 EMERGENCY DEPT VISIT MOD MDM: CPT

## 2023-11-02 PROCEDURE — 6360000002 HC RX W HCPCS: Performed by: STUDENT IN AN ORGANIZED HEALTH CARE EDUCATION/TRAINING PROGRAM

## 2023-11-02 PROCEDURE — 87651 STREP A DNA AMP PROBE: CPT

## 2023-11-02 PROCEDURE — 2580000003 HC RX 258

## 2023-11-02 PROCEDURE — 96372 THER/PROPH/DIAG INJ SC/IM: CPT

## 2023-11-02 RX ORDER — CEFTRIAXONE 1 G/1
500 INJECTION, POWDER, FOR SOLUTION INTRAMUSCULAR; INTRAVENOUS ONCE
Status: COMPLETED | OUTPATIENT
Start: 2023-11-02 | End: 2023-11-02

## 2023-11-02 RX ORDER — DOXYCYCLINE HYCLATE 100 MG
100 TABLET ORAL 2 TIMES DAILY
Qty: 14 TABLET | Refills: 0 | Status: SHIPPED | OUTPATIENT
Start: 2023-11-02 | End: 2023-11-09

## 2023-11-02 RX ORDER — WATER 10 ML/10ML
INJECTION INTRAMUSCULAR; INTRAVENOUS; SUBCUTANEOUS
Status: COMPLETED
Start: 2023-11-02 | End: 2023-11-02

## 2023-11-02 RX ADMIN — CEFTRIAXONE SODIUM 500 MG: 1 INJECTION, POWDER, FOR SOLUTION INTRAMUSCULAR; INTRAVENOUS at 19:49

## 2023-11-02 RX ADMIN — WATER 10 ML: 1 INJECTION INTRAMUSCULAR; INTRAVENOUS; SUBCUTANEOUS at 19:50

## 2023-11-02 ASSESSMENT — ENCOUNTER SYMPTOMS
CONSTIPATION: 0
SINUS PAIN: 0
SINUS PRESSURE: 0
VOMITING: 0
WHEEZING: 0
NAUSEA: 0
PHOTOPHOBIA: 0
ABDOMINAL PAIN: 0
COUGH: 0
DIARRHEA: 0
TROUBLE SWALLOWING: 0
RHINORRHEA: 0
VOICE CHANGE: 0
SHORTNESS OF BREATH: 0
SORE THROAT: 1

## 2023-11-02 ASSESSMENT — LIFESTYLE VARIABLES
HOW OFTEN DO YOU HAVE A DRINK CONTAINING ALCOHOL: NEVER
HOW MANY STANDARD DRINKS CONTAINING ALCOHOL DO YOU HAVE ON A TYPICAL DAY: PATIENT DOES NOT DRINK

## 2023-11-02 ASSESSMENT — PATIENT HEALTH QUESTIONNAIRE - PHQ9
1. LITTLE INTEREST OR PLEASURE IN DOING THINGS: 0
SUM OF ALL RESPONSES TO PHQ QUESTIONS 1-9: 0
SUM OF ALL RESPONSES TO PHQ9 QUESTIONS 1 & 2: 0
SUM OF ALL RESPONSES TO PHQ QUESTIONS 1-9: 0
SUM OF ALL RESPONSES TO PHQ QUESTIONS 1-9: 0
2. FEELING DOWN, DEPRESSED OR HOPELESS: 0
SUM OF ALL RESPONSES TO PHQ QUESTIONS 1-9: 0

## 2023-11-02 ASSESSMENT — PAIN - FUNCTIONAL ASSESSMENT: PAIN_FUNCTIONAL_ASSESSMENT: NONE - DENIES PAIN

## 2023-11-02 NOTE — ED PROVIDER NOTES
2. Acute pharyngitis, unspecified etiology          DISPOSITION/PLAN   DISPOSITION Decision To Discharge 11/02/2023 08:10:02 PM      PATIENT REFERRED TO:  [facility] [address] [facility], [address] [place] [address]  [phone]    Schedule an appointment as soon as possible for a visit in 1 week      [facility]) ED  [address] [address]  [phone]  Go to   As needed, If symptoms worsen      DISCHARGE MEDICATIONS:  Discharge Medication List as of 11/2/2023  8:10 PM        START taking these medications    Details   doxycycline hyclate (VIBRA-TABS) 100 MG tablet Take 1 tablet by mouth 2 times daily for 7 days, Disp-14 tablet, R-0Normal           Controlled Substances Monitoring:          No data to display                (Please note that portions of this note were completed with a voice recognition program.  Efforts were made to edit the dictations but occasionally words are mis-transcribed.)    Sofia King PA-C (electronically signed)             Lucita Mistry  11/02/23 0743

## 2023-11-02 NOTE — ED TRIAGE NOTES
Patient in ED with c/o exposure to STD. Patient denies any penile discharge or symptoms of STD. Patient states \" he had unprotected sex on Oct 24 and female was checked today and told him about it so he felt he should be tested also\". Patient denies any pain. Patient alert and oriented x 4.

## 2023-11-03 NOTE — ED NOTES
Discharge  instructions given and reviewed. Patient verbalized understanding. Patient ambulated out of ED with a steady gait to POV.       Elizabeth Mccracken RN  11/02/23 2019

## 2023-11-06 LAB
SPECIMEN SOURCE: NORMAL
T VAGINALIS RRNA SPEC QL NAA+PROBE: NEGATIVE

## 2023-11-08 LAB
C TRACH DNA UR QL NAA+PROBE: NEGATIVE
N GONORRHOEA DNA UR QL NAA+PROBE: NEGATIVE

## 2023-12-01 ENCOUNTER — HOSPITAL ENCOUNTER (EMERGENCY)
Age: 43
Discharge: HOME | End: 2023-12-01
Payer: MEDICAID

## 2023-12-01 VITALS
DIASTOLIC BLOOD PRESSURE: 67 MMHG | BODY MASS INDEX: 38.6 KG/M2 | RESPIRATION RATE: 18 BRPM | HEART RATE: 85 BPM | WEIGHT: 285 LBS | HEIGHT: 72 IN | TEMPERATURE: 98.2 F | OXYGEN SATURATION: 95 % | SYSTOLIC BLOOD PRESSURE: 111 MMHG

## 2023-12-01 DIAGNOSIS — J34.89 SINUS PRESSURE: ICD-10-CM

## 2023-12-01 DIAGNOSIS — B34.9 VIRAL ILLNESS: Primary | ICD-10-CM

## 2023-12-01 LAB
INFLUENZA A BY PCR: NEGATIVE
INFLUENZA B BY PCR: NEGATIVE
SARS-COV-2 RDRP RESP QL NAA+PROBE: NOT DETECTED

## 2023-12-01 PROCEDURE — 87635 SARS-COV-2 COVID-19 AMP PRB: CPT

## 2023-12-01 PROCEDURE — 87502 INFLUENZA DNA AMP PROBE: CPT

## 2023-12-01 PROCEDURE — 99283 EMERGENCY DEPT VISIT LOW MDM: CPT

## 2023-12-01 RX ORDER — DEXTROMETHORPHAN HYDROBROMIDE AND PROMETHAZINE HYDROCHLORIDE 15; 6.25 MG/5ML; MG/5ML
5 SYRUP ORAL NIGHTLY
Qty: 35 ML | Refills: 0 | Status: SHIPPED | OUTPATIENT
Start: 2023-12-01 | End: 2023-12-08

## 2023-12-01 ASSESSMENT — ENCOUNTER SYMPTOMS
SHORTNESS OF BREATH: 0
DIARRHEA: 0
ABDOMINAL PAIN: 0
SINUS PRESSURE: 1
VOMITING: 0
SINUS PAIN: 1
NAUSEA: 0
SORE THROAT: 0

## 2023-12-01 ASSESSMENT — PAIN - FUNCTIONAL ASSESSMENT: PAIN_FUNCTIONAL_ASSESSMENT: 0-10

## 2023-12-01 ASSESSMENT — PAIN DESCRIPTION - LOCATION: LOCATION: FACE

## 2023-12-01 ASSESSMENT — PAIN SCALES - GENERAL: PAINLEVEL_OUTOF10: 10

## 2023-12-01 NOTE — ED PROVIDER NOTES
[facility] ED  eMERGENCYdEPARTMENT eNCOUnter        Pt Name: Juan Pablo Freire  MRN: [id number]  [address] 1980of evaluation: 12/1/2023  Provider:Zaynab Vora PA-C  5:01 PM EST    CHIEF COMPLAINT       Chief Complaint   Patient presents with    Congestion     Sinus congestion and pressure, taken nyquil, dayquil, mucinex, and benadryl  Symptoms going on for four days          HISTORY OF PRESENT ILLNESS  (Location/Symptom, Timing/Onset, Context/Setting, Quality, Duration, Modifying Factors, Severity.)   Juan Pablo Freire is a 37 y.o. male who presents to the emergency department for evaluation of congestion and sinus pressure for the last 5 days. Patient states the sinus pressure is becoming too much for him to handle, so he came to the emergency department to get a prescription to help. Patient does state he thinks he had a fever yesterday because he \"woke up in a cold sweat\", but hasn't felt feverish other than this one time. Denies any ear pain, sore throat, shortness of breath, chest pain, abdominal pain, nausea, vomiting, or diarrhea. HPI    Nursing Notes were reviewed and I agree. REVIEW OF SYSTEMS    (2-9 systems for level 4, 10 or more for level 5)     Review of Systems   Constitutional:  Negative for fever. HENT:  Positive for congestion, sinus pressure and sinus pain. Negative for ear pain and sore throat. Respiratory:  Negative for shortness of breath. Cardiovascular:  Negative for chest pain. Gastrointestinal:  Negative for abdominal pain, diarrhea, nausea and vomiting. All other systems reviewed and are negative. as noted above the remainder of the review of systems was reviewed and negative.        PAST MEDICAL HISTORY     Past Medical History:   Diagnosis Date    Pyloric stenosis          SURGICAL HISTORY       Past Surgical History:   Procedure Laterality Date    ABDOMEN SURGERY           CURRENT MEDICATIONS       Discharge Medication List as of 12/1/2023  6:00

## 2024-07-03 ENCOUNTER — HOSPITAL ENCOUNTER (EMERGENCY)
Age: 44
Discharge: HOME | End: 2024-07-03
Payer: MEDICAID

## 2024-07-03 VITALS
TEMPERATURE: 97.8 F | HEIGHT: 75 IN | BODY MASS INDEX: 32.83 KG/M2 | RESPIRATION RATE: 16 BRPM | OXYGEN SATURATION: 93 % | SYSTOLIC BLOOD PRESSURE: 130 MMHG | DIASTOLIC BLOOD PRESSURE: 79 MMHG | WEIGHT: 264 LBS | HEART RATE: 83 BPM

## 2024-07-03 DIAGNOSIS — M54.32 SCIATICA OF LEFT SIDE: Primary | ICD-10-CM

## 2024-07-03 PROCEDURE — 99283 EMERGENCY DEPT VISIT LOW MDM: CPT

## 2024-07-03 RX ORDER — METHOCARBAMOL 750 MG/1
750 TABLET, FILM COATED ORAL 4 TIMES DAILY
Qty: 20 TABLET | Refills: 0 | Status: SHIPPED | OUTPATIENT
Start: 2024-07-03 | End: 2024-07-08

## 2024-07-03 RX ORDER — HYDROCODONE BITARTRATE AND ACETAMINOPHEN 5; 325 MG/1; MG/1
1 TABLET ORAL EVERY 6 HOURS PRN
Qty: 10 TABLET | Refills: 0 | Status: SHIPPED | OUTPATIENT
Start: 2024-07-03 | End: 2024-07-06

## 2024-07-03 RX ORDER — PREDNISONE 10 MG/1
40 TABLET ORAL DAILY
Qty: 20 TABLET | Refills: 0 | Status: SHIPPED | OUTPATIENT
Start: 2024-07-03 | End: 2024-07-08

## 2024-07-03 ASSESSMENT — PAIN SCALES - GENERAL: PAINLEVEL_OUTOF10: 10

## 2024-07-03 ASSESSMENT — ENCOUNTER SYMPTOMS
COUGH: 0
BACK PAIN: 1
SORE THROAT: 0
SHORTNESS OF BREATH: 0
TROUBLE SWALLOWING: 0
VOMITING: 0
ABDOMINAL PAIN: 0
DIARRHEA: 0
NAUSEA: 0

## 2024-07-03 ASSESSMENT — PAIN - FUNCTIONAL ASSESSMENT: PAIN_FUNCTIONAL_ASSESSMENT: 0-10

## 2024-07-03 ASSESSMENT — LIFESTYLE VARIABLES
HOW MANY STANDARD DRINKS CONTAINING ALCOHOL DO YOU HAVE ON A TYPICAL DAY: 1 OR 2
HOW OFTEN DO YOU HAVE A DRINK CONTAINING ALCOHOL: MONTHLY OR LESS

## 2024-07-03 ASSESSMENT — PAIN DESCRIPTION - DESCRIPTORS: DESCRIPTORS: ACHING;SHOOTING;SHARP

## 2024-07-03 ASSESSMENT — PAIN DESCRIPTION - LOCATION: LOCATION: HIP;BUTTOCKS

## 2024-07-03 ASSESSMENT — PAIN DESCRIPTION - PAIN TYPE: TYPE: ACUTE PAIN

## 2024-07-03 ASSESSMENT — PAIN DESCRIPTION - ORIENTATION: ORIENTATION: LEFT

## 2024-07-03 NOTE — ED PROVIDER NOTES
[facility] ED  eMERGENCY dEPARTMENT eNCOUnter      Pt Name: Danielito Bazan  MRN: [id number]  Birthdate 1980  Date of evaluation: 7/3/2024  Provider: TOM Mack CNP      HISTORY OF PRESENT ILLNESS    Danielito Bazan is a 43 y.o. male who presents to the Emergency Department with L low back pain that radiates down the L leg for 1 month, getting worse in the last few days.  Pain is moderate.  He denies any injury or trauma but states he stands a lot at work.  Denies saddle anesthesia. Foot drop or incontinence of bowel or bladder.         REVIEW OF SYSTEMS       Review of Systems   Constitutional:  Negative for fever.   HENT:  Negative for congestion, drooling, ear pain, sore throat and trouble swallowing.    Respiratory:  Negative for cough and shortness of breath.    Cardiovascular:  Negative for chest pain.   Gastrointestinal:  Negative for abdominal pain, diarrhea, nausea and vomiting.   Genitourinary:  Negative for dysuria.   Musculoskeletal:  Positive for back pain (L). Negative for arthralgias.   Skin:  Negative for rash.   Neurological:  Negative for dizziness, syncope, light-headedness and headaches.   All other systems reviewed and are negative.        PAST MEDICAL HISTORY     Past Medical History:   Diagnosis Date    Pyloric stenosis          SURGICAL HISTORY       Past Surgical History:   Procedure Laterality Date    ABDOMEN SURGERY           CURRENT MEDICATIONS       Previous Medications    No medications on file       ALLERGIES     Amoxicillin and Pcn [penicillins]    FAMILY HISTORY     History reviewed. No pertinent family history.       SOCIAL HISTORY       Social History     Socioeconomic History    Marital status:      Spouse name: None    Number of children: None    Years of education: None    Highest education level: None   Tobacco Use    Smoking status: Every Day     Current packs/day: 0.25     Average packs/day: 0.3 packs/day for 20.0 years (5.0 ttl pk-yrs)

## 2024-10-26 ENCOUNTER — HOSPITAL ENCOUNTER (EMERGENCY)
Age: 44
Discharge: HOME | End: 2024-10-26
Payer: MEDICAID

## 2024-10-26 ENCOUNTER — APPOINTMENT (OUTPATIENT)
Dept: GENERAL RADIOLOGY | Age: 44
End: 2024-10-26
Payer: MEDICAID

## 2024-10-26 VITALS
DIASTOLIC BLOOD PRESSURE: 83 MMHG | OXYGEN SATURATION: 98 % | BODY MASS INDEX: 35.43 KG/M2 | HEIGHT: 75 IN | RESPIRATION RATE: 16 BRPM | SYSTOLIC BLOOD PRESSURE: 116 MMHG | TEMPERATURE: 98.1 F | HEART RATE: 79 BPM | WEIGHT: 285 LBS

## 2024-10-26 DIAGNOSIS — M54.32 SCIATICA OF LEFT SIDE: Primary | ICD-10-CM

## 2024-10-26 PROCEDURE — 73502 X-RAY EXAM HIP UNI 2-3 VIEWS: CPT

## 2024-10-26 PROCEDURE — 99283 EMERGENCY DEPT VISIT LOW MDM: CPT

## 2024-10-26 RX ORDER — MELOXICAM 15 MG/1
15 TABLET ORAL DAILY
Qty: 30 TABLET | Refills: 0 | Status: SHIPPED | OUTPATIENT
Start: 2024-10-26 | End: 2024-11-25

## 2024-10-26 ASSESSMENT — LIFESTYLE VARIABLES
HOW MANY STANDARD DRINKS CONTAINING ALCOHOL DO YOU HAVE ON A TYPICAL DAY: PATIENT DOES NOT DRINK
HOW OFTEN DO YOU HAVE A DRINK CONTAINING ALCOHOL: NEVER

## 2024-10-26 ASSESSMENT — PAIN DESCRIPTION - LOCATION: LOCATION: HIP

## 2024-10-26 ASSESSMENT — PAIN - FUNCTIONAL ASSESSMENT
PAIN_FUNCTIONAL_ASSESSMENT: PREVENTS OR INTERFERES SOME ACTIVE ACTIVITIES AND ADLS
PAIN_FUNCTIONAL_ASSESSMENT: 0-10

## 2024-10-26 ASSESSMENT — PAIN SCALES - GENERAL: PAINLEVEL_OUTOF10: 10

## 2024-10-26 ASSESSMENT — PAIN DESCRIPTION - DESCRIPTORS: DESCRIPTORS: SHARP

## 2024-10-26 ASSESSMENT — PAIN DESCRIPTION - ONSET: ONSET: ON-GOING

## 2024-10-26 ASSESSMENT — PAIN DESCRIPTION - PAIN TYPE: TYPE: ACUTE PAIN

## 2024-10-26 ASSESSMENT — ENCOUNTER SYMPTOMS: RESPIRATORY NEGATIVE: 1

## 2024-10-26 ASSESSMENT — PAIN DESCRIPTION - ORIENTATION: ORIENTATION: LEFT

## 2024-10-26 ASSESSMENT — PAIN DESCRIPTION - FREQUENCY: FREQUENCY: CONTINUOUS

## 2024-10-26 NOTE — DISCHARGE INSTRUCTIONS
Drink plenty fluids, get plenty of rest.  Take the meloxicam as prescribed.  Follow-up with your family physician if no improvement in 7-10 days.  May use heat or ice to the back for comfort.  Limit your lifting and bending for the next few days.

## 2024-10-26 NOTE — ED PROVIDER NOTES
[facility] ED  EMERGENCY DEPARTMENT ENCOUNTER      Pt Name: Danielito Bazan  MRN: [id number]  Birthdate 1980  Date of evaluation: 10/26/2024  Provider: TOM LANCASTER CNP    CHIEF COMPLAINT       Chief Complaint   Patient presents with    Hip Pain     Pt c/o left hip pain, denies recent trauma         HISTORY OF PRESENT ILLNESS   (Location/Symptom, Timing/Onset, Context/Setting, Quality, Duration, Modifying Factors, Severity)  Note limiting factors.   Danielito Bazan is a 44 y.o. male who presents to the emergency department      44-year-old male comes to the ER with complaints of left hip pain that radiates to his left knee.  Patient states he fell in March onto the left hip.  He states no x-rays were done.  He states he has been taking meloxicam with no improvement in the pain.  He has not followed up with his family physician.  He is able to ambulate however does cause pain.  Denies any numbness or tingling to his leg.  Denies any change in bowel or bladder habits.        Nursing Notes were reviewed.    REVIEW OF SYSTEMS    (2-9 systems for level 4, 10 or more for level 5)     Review of Systems   Constitutional: Negative.    Respiratory: Negative.     Cardiovascular: Negative.    Musculoskeletal:         Left posterior and lateral hip pain radiating to left knee   Skin: Negative.    Neurological: Negative.    Psychiatric/Behavioral: Negative.         Except as noted above the remainder of the review of systems was reviewed and negative.       PAST MEDICAL HISTORY     Past Medical History:   Diagnosis Date    Pyloric stenosis          SURGICAL HISTORY       Past Surgical History:   Procedure Laterality Date    ABDOMEN SURGERY           CURRENT MEDICATIONS       Previous Medications    No medications on file       ALLERGIES     Amoxicillin and Pcn [penicillins]    FAMILY HISTORY     History reviewed. No pertinent family history.       SOCIAL HISTORY       Social History     Socioeconomic  Lab at bedside. Blood culture obtained from SIGRID Florian RN  04/20/22 0940 History    Marital status:      Spouse name: None    Number of children: None    Years of education: None    Highest education level: None   Tobacco Use    Smoking status: Every Day     Current packs/day: 0.25     Average packs/day: 0.3 packs/day for 20.0 years (5.0 ttl pk-yrs)     Types: Cigarettes     Passive exposure: Current    Smokeless tobacco: Never   Vaping Use    Vaping status: Never Used   Substance and Sexual Activity    Alcohol use: No    Drug use: No    Sexual activity: Yes     Partners: Female     Social Determinants of Health     Financial Resource Strain: Low Risk  (12/17/2021)    Overall Financial Resource Strain (CARDIA)     Difficulty of Paying Living Expenses: Not hard at all   Food Insecurity: No Food Insecurity (12/17/2021)    Hunger Vital Sign     Worried About Running Out of Food in the Last Year: Never true     Ran Out of Food in the Last Year: Never true       SCREENINGS    [place] Coma Scale  Eye Opening: Spontaneous  Best Verbal Response: Oriented  Best Motor Response: Obeys commands  [place] Coma Scale Score: 15          PHYSICAL EXAM    (up to 7 for level 4, 8 or more for level 5)     ED Triage Vitals [10/26/24 1514]   BP Systolic BP Percentile Diastolic BP Percentile Temp Temp Source Pulse Respirations SpO2   116/83 -- -- 98.1 °F (36.7 °C) Oral 79 16 98 %      Height Weight - Scale         1.905 m (6' 3\") 129.3 kg (285 lb)             Physical Exam  Vitals and nursing note reviewed.   Constitutional:       Appearance: Normal appearance.      Comments: Ambulates without difficulty   HENT:      Head: Normocephalic and atraumatic.   Eyes:      Extraocular Movements: Extraocular movements intact.      Pupils: Pupils are equal, round, and reactive to light.   Musculoskeletal:      Cervical back: Normal range of motion and neck supple.      Comments: No tenderness swelling or step-off deformity noted to the lumbar spine.  There is moderate tenderness to the left SI area.  There is no